# Patient Record
Sex: MALE | Race: WHITE | NOT HISPANIC OR LATINO | Employment: OTHER | RURAL
[De-identification: names, ages, dates, MRNs, and addresses within clinical notes are randomized per-mention and may not be internally consistent; named-entity substitution may affect disease eponyms.]

---

## 2020-05-05 ENCOUNTER — OFFICE VISIT (OUTPATIENT)
Dept: OTOLARYNGOLOGY | Facility: CLINIC | Age: 60
End: 2020-05-05

## 2020-05-05 VITALS — BODY MASS INDEX: 30.64 KG/M2 | WEIGHT: 214 LBS | TEMPERATURE: 98.7 F | HEIGHT: 70 IN | OXYGEN SATURATION: 99 %

## 2020-05-05 DIAGNOSIS — H72.91 PERFORATION OF RIGHT TYMPANIC MEMBRANE: Primary | ICD-10-CM

## 2020-05-05 DIAGNOSIS — H92.11 OTORRHEA OF RIGHT EAR: ICD-10-CM

## 2020-05-05 DIAGNOSIS — H95.191 ENCOUNTER FOR MASTOIDECTOMY CAVITY DEBRIDEMENT, RIGHT: ICD-10-CM

## 2020-05-05 PROCEDURE — 99203 OFFICE O/P NEW LOW 30 MIN: CPT | Performed by: OTOLARYNGOLOGY

## 2020-05-05 PROCEDURE — 69220 CLEAN OUT MASTOID CAVITY: CPT | Performed by: OTOLARYNGOLOGY

## 2020-05-05 RX ORDER — FLUOXETINE 10 MG/1
10 CAPSULE ORAL DAILY
COMMUNITY

## 2020-05-05 RX ORDER — SIMVASTATIN 20 MG
20 TABLET ORAL NIGHTLY
COMMUNITY

## 2020-05-05 RX ORDER — CHLORAL HYDRATE 500 MG
CAPSULE ORAL
COMMUNITY

## 2020-05-05 RX ORDER — TADALAFIL 5 MG/1
5 TABLET ORAL DAILY PRN
COMMUNITY

## 2020-05-05 RX ORDER — ASPIRIN 81 MG/1
81 TABLET ORAL DAILY
COMMUNITY

## 2020-05-06 NOTE — PROGRESS NOTES
Subjective   Maycol Dixon is a 59 y.o. male.     History of Present Illness   Patient is here for ear problems.  Has apparently had 2 surgeries on his right ear, one in the late 1990s and a second surgery performed by Dr. Youngblood in 2014.  (Addendum 5/19/2020: Patient brings his medical records from previous surgeries and in fact Dr. Youngblood operated on the patient's left ear in 2014.  The most recent surgery on his right ear was performed in 1998 ).  He wears hearing aids and states he has been [facility] get [facility] to get his [facility] cleaned for at least a couple of years and so his ear is stopped up and this affects his hearing aid.  Nothing seems to make this any better.  He was given some oral antibiotics from Dr. Youngblood office via telephone but this did not seem to help anything.  He says his ear usually does not drain.      The following portions of the patient's history were reviewed and updated as appropriate: allergies, current medications, past family history, past medical history, past social history, past surgical history and problem list.      Maycol Dixon reports that he quit smoking about 5 months ago. His smoking use included cigarettes. He started smoking about 43 years ago. He smoked 1.00 pack per day. He quit smokeless tobacco use about 10 years ago. He reports that he drank alcohol. Drug use questions deferred to the physician.  Patient is not a tobacco user and has not been counseled for use of tobacco products    Family History   Problem Relation Age of Onset   • Heart failure Mother    • Cancer Father    • Diabetes Father    • Cancer Paternal Grandfather        No Known Allergies      Current Outpatient Medications:   •  aspirin 81 MG EC tablet, Take 81 mg by mouth Daily., Disp: , Rfl:   •  FLUoxetine (PROzac) 10 MG capsule, Take 10 mg by mouth Daily., Disp: , Rfl:   •  Omega-3 Fatty Acids (FISH OIL) 1000 MG capsule capsule, Take  by mouth Daily With Breakfast., Disp: ,  Rfl:   •  simvastatin (ZOCOR) 20 MG tablet, Take 20 mg by mouth Every Night., Disp: , Rfl:   •  tadalafil (CIALIS) 5 MG tablet, Take 5 mg by mouth Daily As Needed for Erectile Dysfunction., Disp: , Rfl:     Past Medical History:   Diagnosis Date   • Emphysema lung (CMS/HCC)    • Heart disease    • High blood pressure    • HL (hearing loss)        Past Surgical History:   Procedure Laterality Date   • MASTOID SURGERY  1996    Left Ear   • MIDDLE EAR SURGERY  2015    Left ear         Review of Systems   Gastrointestinal: Positive for nausea.   Endocrine: Positive for heat intolerance.   Musculoskeletal: Positive for arthralgias, back pain, neck pain and neck stiffness.   Psychiatric/Behavioral:        Not assessed   All other systems reviewed and are negative.          Objective   Physical Exam  General: Well-developed well-nourished male in no acute distress.  Alert and oriented x3. Head: Normocephalic. Face: Symmetrical strength and appearance. PERRL. EOMI. Voice:Strong. Speech:Fluent  Ears: External ears no deformity, left ear canal shows some nonobstructing wax is removed under the microscope.  Beyond this there is tympanosclerosis with no evidence of infection or effusion.  Right ear shows what initially appears to be a cerumen impaction.  As this was cleaned under the microscope a defect in the posterior canal wall with a large amount of squamous debris was noted.  This was debrided to the limits of patient tolerance.  The tympanic membrane had a posterior superior perforation with granulation tissue and there was a bridge of bone between the defect in the posterior canal wall and the tympanic membrane.  It is not clear if as a result of recurrent cholesteatoma.  This is all debrided with suction and Ciprodex is instilled.  Nose: Nares show no discharge mass polyp or purulence.  Boggy mucosa is present.  No gross external deformity.  Septum: Midline  Oral cavity: Lips and gums without lesions.  Tongue and floor  of mouth without lesions.  Parotid and submandibular ducts unobstructed.  No mucosal lesions on the buccal mucosa or vestibule of the mouth.  Pharynx: No erythema exudate mass or ulcer  Neck: No lymphadenopathy.  No thyromegaly.  Trachea and larynx midline.  No masses in the parotid or submandibular glands.        Assessment/Plan   Maycol was seen today for ear problem.    Diagnoses and all orders for this visit:    Perforation of right tympanic membrane    Otorrhea of right ear    Encounter for mastoidectomy cavity debridement, right      Plan: Mastoid cavity and right ear debrided as described above.  Told the patient I did not want him to wait for the [facility] to mail him medication and so I have written him prescriptions for both Cortisporin and Ciprodex [place]  I told him if the Ciprodex was covered I would prefer that he use that but if not he should use the Cortisporin.  He is to return in 2 weeks.  He says he has copies of his operative reports and I have asked him to bring those with him so I can determine if the canal wall down defect on the right is surgical or represents a recurrent cholesteatoma.

## 2020-05-19 ENCOUNTER — OFFICE VISIT (OUTPATIENT)
Dept: OTOLARYNGOLOGY | Facility: CLINIC | Age: 60
End: 2020-05-19

## 2020-05-19 VITALS — HEIGHT: 70 IN | WEIGHT: 220 LBS | TEMPERATURE: 98.2 F | BODY MASS INDEX: 31.5 KG/M2

## 2020-05-19 DIAGNOSIS — H95.01 RECURRENT CHOLESTEATOMA OF POSTMASTOIDECTOMY CAVITY OF RIGHT SIDE: Primary | ICD-10-CM

## 2020-05-19 PROCEDURE — 99213 OFFICE O/P EST LOW 20 MIN: CPT | Performed by: OTOLARYNGOLOGY

## 2020-05-19 NOTE — PROGRESS NOTES
Subjective   Maycol Dixon is a 59 y.o. male.       History of Present Illness   Patient has a history of ear disease status post multiple ear surgeries.  Today he brings his records with him that clarifies his previous surgical history.  He has apparently had 3 surgical procedures on his right ear, but the most recent was in 1998.  Dr. Youngblood operated on his left ear in 2014.  Patient was seen 2 weeks ago with a defect in the right posterior canal wall with infection and it was unclear if this was a surgical canal wall down mastoidectomy or a defect in the posterior canal wall due to recurrent cholesteatoma.  He was given Ciprodex and reports he has been using this and is not having otorrhea at this point.  After reviewing his previous operative report from 1998 it appears that his canal wall defect on the right is not surgical but a recurrent cholesteatoma.  Patient reports he is having dizziness when he uses the Ciprodex and I explained the phenomenon of caloric stimulation to him.      The following portions of the patient's history were reviewed and updated as appropriate: allergies, current medications, past family history, past medical history, past social history, past surgical history and problem list.     reports that he quit smoking about 5 months ago. His smoking use included cigarettes. He started smoking about 43 years ago. He smoked 1.00 pack per day. He quit smokeless tobacco use about 10 years ago. He reports that he drank alcohol. Drug use questions deferred to the physician.   Patient is not a tobacco user and has not been counseled for use of tobacco products      Review of Systems   Constitutional: Negative for fever.           Objective   Physical Exam  Right ear again shows defect in the posterior canal.  There is drop residue and some dried squamous debris but no granulation tissue or purulence.  What had previously appeared to be a posterior superior perforation is now just another area of  retraction with drop residue and resolution of the previously noted granulation tissue.  Left ear canal shows no discharge.  Tympanic membrane shows tympanosclerosis and postsurgical changes but no evidence of recurrent cholesteatoma or active disease.      Assessment/Plan   Maycol was seen today for follow-up.    Diagnoses and all orders for this visit:    Recurrent cholesteatoma of postmastoidectomy cavity of right side      Plan: I told the patient I thought he would benefit from a referral back to Dr. Youngblood.  I suspect he would benefit from converting his right ear to a formal canal wall down mastoid cavity which will be much easier to debride and keep free of infection then trying to debride his cholesteatoma through the current defect in the posterior canal wall.  He may discontinue the Ciprodex for the time being.  I told him his [facility] authorization with me runs through July 27 and if he develops recurrent otorrhea and has not yet been able to see Dr. Youngblood to call and I would treat any acute flareups until that time.  Otherwise see me again as needed.